# Patient Record
Sex: FEMALE | Race: WHITE | URBAN - METROPOLITAN AREA
[De-identification: names, ages, dates, MRNs, and addresses within clinical notes are randomized per-mention and may not be internally consistent; named-entity substitution may affect disease eponyms.]

---

## 2017-02-11 ENCOUNTER — ALLIED HEALTH/NURSE VISIT (OUTPATIENT)
Dept: CARDIOLOGY | Facility: CLINIC | Age: 32
End: 2017-02-11
Attending: INTERNAL MEDICINE
Payer: COMMERCIAL

## 2017-02-11 DIAGNOSIS — G90.A POTS (POSTURAL ORTHOSTATIC TACHYCARDIA SYNDROME): Primary | ICD-10-CM

## 2017-02-11 PROCEDURE — 93298 REM INTERROG DEV EVAL SCRMS: CPT | Performed by: INTERNAL MEDICINE

## 2017-02-11 PROCEDURE — 93299 C ICM/ILR REMOTE TECH SERV: CPT

## 2017-02-11 NOTE — MR AVS SNAPSHOT
"              After Visit Summary   2017    Shalini Bansal    MRN: [id number]           Patient Information     Date Of Birth          1985        Visit Information        Provider Department      2017 6:00 AM  ICD REMOTE [facility]        Today's Diagnoses     POTS (postural orthostatic tachycardia syndrome)    -  1       Follow-ups after your visit        Follow-up notes from your care team     Call patient with results      Who to contact     If you have questions or need follow up information about today's clinic visit or your schedule please contact [facility] directly at [phone].  Normal or non-critical lab and imaging results will be communicated to you by Receptoshart, letter or phone within 4 business days after the clinic has received the results. If you do not hear from us within 7 days, please contact the clinic through Receptoshart or phone. If you have a critical or abnormal lab result, we will notify you by phone as soon as possible.  Submit refill requests through [email or web address] or call your pharmacy and they will forward the refill request to us. Please allow 3 business days for your refill to be completed.          Additional Information About Your Visit        MyChart Information     [email or web address] lets you send messages to your doctor, view your test results, renew your prescriptions, schedule appointments and more. To sign up, go to [email or web address] . Click on \"Log in\" on the left side of the screen, which will take you to the Welcome page. Then click on \"Sign up Now\" on the right side of the page.     You will be asked to enter the access code listed below, as well as some personal information. Please follow the directions to create your username and password.     Your access code is: [email or web address]  Expires: 2017 12:57 PM     Your access code will  in 90 days. If you need help or a new code, please call your [place] clinic or [phone].        Care EveryWhere " ID     This is your Care EveryWhere ID. This could be used by other organizations to access your [place] medical records  [id number]         Blood Pressure from Last 3 Encounters:   05/02/16 98/70   03/03/16 113/68   02/19/16 115/70    Weight from Last 3 Encounters:   05/02/16 60.3 kg (133 lb)   04/28/16 60.3 kg (133 lb)   03/03/16 60.3 kg (133 lb)              We Performed the Following     ICM/ILR REMOTE [facility]        Primary Care Provider Office Phone # Fax #    Almaz Felder [phone]       [address] [address]        Thank you!     Thank you for choosing [facility]  for your care. Our goal is always to provide you with excellent care. Hearing back from our patients is one way we can continue to improve our services. Please take a few minutes to complete the written survey that you may receive in the mail after your visit with us. Thank you!             Your Updated Medication List - Protect others around you: Learn how to safely use, store and throw away your medicines at www.disposemymeds.org.          This list is accurate as of: 2/11/17 11:59 PM.  Always use your most recent med list.                   Brand Name Dispense Instructions for use    fluticasone-vilanterol 100-25 MCG/INH oral inhaler    BREO ELLIPTA    1 Inhaler    Inhale 1 puff into the lungs daily

## 2017-02-13 NOTE — PROGRESS NOTES
A routine remote loop recorder check was sent for evaluation per MD order.  Her loop recorder does not show any recorded episodes, her heart rate histograms show good heart rate variation and her loop battery is good, she is scheduled for her next remote 5/8/17.  Pt was called with the results and she reports feeling well and she denies any symptoms.  She will send her next remote 5/8/17.    Loop recorder

## 2017-03-28 ENCOUNTER — ALLIED HEALTH/NURSE VISIT (OUTPATIENT)
Dept: CARDIOLOGY | Facility: CLINIC | Age: 32
End: 2017-03-28
Attending: INTERNAL MEDICINE
Payer: COMMERCIAL

## 2017-03-28 DIAGNOSIS — R00.0 SINUS TACHYCARDIA: Primary | ICD-10-CM

## 2017-03-28 PROBLEM — Z95.0 CARDIAC PACEMAKER IN SITU: Status: ACTIVE | Noted: 2017-03-28

## 2017-03-28 PROCEDURE — 93298 REM INTERROG DEV EVAL SCRMS: CPT | Performed by: INTERNAL MEDICINE

## 2017-03-28 PROCEDURE — 93299 HC INTERROGATION DEVICE EVAL REMOTE, LOOP RECORDER: CPT | Mod: ZF

## 2017-03-28 NOTE — MR AVS SNAPSHOT
"              After Visit Summary   3/28/2017    Shalini Bansal    MRN: [id number]           Patient Information     Date Of Birth          1985        Visit Information        Provider Department      3/28/2017 6:00 AM UC ICD REMOTE [facility]        Today's Diagnoses     Sinus tachycardia    -  1       Follow-ups after your visit        Who to contact     If you have questions or need follow up information about today's clinic visit or your schedule please contact [facility] directly at [phone].  Normal or non-critical lab and imaging results will be communicated to you by Zelgorhart, letter or phone within 4 business days after the clinic has received the results. If you do not hear from us within 7 days, please contact the clinic through Zelgorhart or phone. If you have a critical or abnormal lab result, we will notify you by phone as soon as possible.  Submit refill requests through [employer] or call your pharmacy and they will forward the refill request to us. Please allow 3 business days for your refill to be completed.          Additional Information About Your Visit        MyChart Information     [employer] lets you send messages to your doctor, view your test results, renew your prescriptions, schedule appointments and more. To sign up, go to [email or web address] . Click on \"Log in\" on the left side of the screen, which will take you to the Welcome page. Then click on \"Sign up Now\" on the right side of the page.     You will be asked to enter the access code listed below, as well as some personal information. Please follow the directions to create your username and password.     Your access code is: [email or web address]  Expires: 2017  1:57 PM     Your access code will  in 90 days. If you need help or a new code, please call your [place] clinic or [phone].        Care EveryWhere ID     This is your Care EveryWhere ID. This could be used by other organizations to access your " [place] medical records  [id number]         Blood Pressure from Last 3 Encounters:   05/02/16 98/70   03/03/16 113/68   02/19/16 115/70    Weight from Last 3 Encounters:   05/02/16 60.3 kg (133 lb)   04/28/16 60.3 kg (133 lb)   03/03/16 60.3 kg (133 lb)              We Performed the Following     REMOTE LOOP/OPTIVOL INTERROGATION        Primary Care Provider Office Phone # Fax #    Almaz Felder [phone]       [address] [address]        Thank you!     Thank you for choosing [facility]  for your care. Our goal is always to provide you with excellent care. Hearing back from our patients is one way we can continue to improve our services. Please take a few minutes to complete the written survey that you may receive in the mail after your visit with us. Thank you!             Your Updated Medication List - Protect others around you: Learn how to safely use, store and throw away your medicines at www.disposemymeds.org.          This list is accurate as of: 3/28/17 12:42 PM.  Always use your most recent med list.                   Brand Name Dispense Instructions for use    fluticasone-vilanterol 100-25 MCG/INH oral inhaler    BREO ELLIPTA    1 Inhaler    Inhale 1 puff into the lungs daily

## 2017-03-28 NOTE — PROGRESS NOTES
Medtronic ILR remote transmission received and reviewed. Device transmission sent per MD orders. Normal device function. 1 symptom activation recorded and 1 tachy episode recorded. These were at the same time. The EGM shows a narrow complex tachycardia @ 214 bpm. The EGMs reviewed with Dr. Heller. This has the same morphology as her sinus beats. VM left for pt to call and discuss symptoms. Plan for pt to send another Carelink remote transmission on 6/29/17.

## 2017-10-08 ENCOUNTER — HEALTH MAINTENANCE LETTER (OUTPATIENT)
Age: 32
End: 2017-10-08

## 2018-07-27 ENCOUNTER — ALLIED HEALTH/NURSE VISIT (OUTPATIENT)
Dept: CARDIOLOGY | Facility: CLINIC | Age: 33
End: 2018-07-27
Attending: INTERNAL MEDICINE
Payer: COMMERCIAL

## 2018-07-27 DIAGNOSIS — R55 SYNCOPE: Primary | ICD-10-CM

## 2018-07-27 PROCEDURE — 93291 INTERROG DEV EVAL SCRMS IP: CPT | Mod: ZF

## 2018-07-27 NOTE — PROGRESS NOTES
Preliminary Device Interrogation Results.  Final physician signed paceart report to be scanned and attached.    Patient seen in clinic for evaluation and iterative programming of Medtronic Linq loop recorder per MD orders. Patient noted that she needs a DOT letter. The request will be sent to Dr. Salgado and Keisha Aguilera RN.  Normal loop recorder function. Since last remote transmission 3/28/2017 there has only been one Tachy episode on April 23rd 2017 that the patient didn't remember. The tachy episode displayed a regular rhythm@ rates 200bpm for 29 seconds.The patient reports she has been feeling great and has not had any symptoms of syncope, dizziness, palpations in last year. Intrinsic rhythm = regular VS @ 80's bpm.  Loop recorder battery status = good. Patient has concerns about the costs associated with remote monitoring so does not want to schedule a remote. Education on the importance of her contacting device clinic with any symptoms in the future. Plan for patient to be seen April 2019 with device clinic and Dr. Salgado as the device will be approach RRT.    Loop recorder interrogation

## 2018-07-27 NOTE — MR AVS SNAPSHOT
"              After Visit Summary   7/27/2018    Shalini Bansal    MRN: [id number]           Patient Information     Date Of Birth          1985        Visit Information        Provider Department      7/27/2018 11:00 AM 1, Uc Cv Device [facility]        Today's Diagnoses     Syncope    -  1       Follow-ups after your visit        Additional Services     Follow-Up with Device Clinic-6 months       Prior to Dr. Naomi del rio            Follow-Up with Electrophysiologist-6 Months       Device prior; Device approaching RRT. Establishing plan.                  Who to contact     If you have questions or need follow up information about today's clinic visit or your schedule please contact [facility] directly at [phone].  Normal or non-critical lab and imaging results will be communicated to you by MyChart, letter or phone within 4 business days after the clinic has received the results. If you do not hear from us within 7 days, please contact the clinic through MyChart or phone. If you have a critical or abnormal lab result, we will notify you by phone as soon as possible.  Submit refill requests through [employer]System[employer] or call your pharmacy and they will forward the refill request to us. Please allow 3 business days for your refill to be completed.          Additional Information About Your Visit        MyChart Information     CoolSystems lets you send messages to your doctor, view your test results, renew your prescriptions, schedule appointments and more. To sign up, go to [email or web address] . Click on \"Log in\" on the left side of the screen, which will take you to the Welcome page. Then click on \"Sign up Now\" on the right side of the page.     You will be asked to enter the access code listed below, as well as some personal information. Please follow the directions to create your username and password.     Your access code is: [email or web address]  Expires: 10/23/2018  6:31 AM     Your access code will "  in 90 days. If you need help or a new code, please call your [place] clinic or [phone].        Care EveryWhere ID     This is your Care EveryWhere ID. This could be used by other organizations to access your [place] medical records  [id number]         Blood Pressure from Last 3 Encounters:   16 98/70   16 113/68   16 115/70    Weight from Last 3 Encounters:   16 60.3 kg (133 lb)   16 60.3 kg (133 lb)   16 60.3 kg (133 lb)              We Performed the Following     INTERR DEVICE EVAL IN PERSON, LOOP RECORDER        Primary Care Provider Office Phone # Fax #    Almaz Felder [phone] [address] [facility] [address]        Equal Access to Services     [facility]: Hadii aad ku hadasho Soomaali, waaxda luqadaha, qaybta kaalmada adeegyada, betsey garg haycyn smith . So [facility] [phone].    ATENCIÓN: Si habla español, tiene a lee disposición servicios gratuitos de asistencia lingüística. Davidame al [phone].    We comply with applicable federal civil rights laws and [place] laws. We do not discriminate on the basis of race, color, national origin, age, disability, sex, sexual orientation, or gender identity.            Thank you!     Thank you for choosing [facility]  for your care. Our goal is always to provide you with excellent care. Hearing back from our patients is one way we can continue to improve our services. Please take a few minutes to complete the written survey that you may receive in the mail after your visit with us. Thank you!             Your Updated Medication List - Protect others around you: Learn how to safely use, store and throw away your medicines at www.disposemymeds.org.          This list is accurate as of 18 11:59 PM.  Always use your most recent med list.                   Brand Name Dispense Instructions for use Diagnosis    fluticasone-vilanterol 100-25  MCG/INH oral inhaler    BREO ELLIPTA    1 Inhaler    Inhale 1 puff into the lungs daily    Asthma

## 2018-07-30 ENCOUNTER — CARE COORDINATION (OUTPATIENT)
Dept: CARDIOLOGY | Facility: CLINIC | Age: 33
End: 2018-07-30

## 2019-07-23 NOTE — TELEPHONE ENCOUNTER
RECORDS RECEIVED FROM: Internal   DATE RECEIVED: 8-1   NOTES STATUS DETAILS   OFFICE NOTE from referring provider    N/A    OFFICE NOTE from other cardiologists   and neurologists Internal    DISCHARGE SUMMARY from hospital    Internal 2016   DISCHARGE REPORT from the ER   N/A    OPERATIVE REPORT    Internal Ablation 5-2-16   MEDICATION LIST   Internal    LABS     BMP   N/A    CBC   Internal 2010   CMP   N/A    Lipids   Care Everywhere 11-5-18   TSH   Internal 2015   DIAGNOSTIC PROCEDURES     EKG  Strips *important Internal 2016   Monitor Reports  Strips *important Internal    Cardioversions   N/A    ICD/pacemaker implant   Yes    Tilt table studies   N/A    IMAGING (DISC & REPORT)      ECHO's   Internal 2015   Stress Tests   Internal 2015   Cath   N/A    CT/CTA   N/A    MRI/MRA   N/A

## 2019-08-01 ENCOUNTER — OFFICE VISIT (OUTPATIENT)
Dept: CARDIOLOGY | Facility: CLINIC | Age: 34
End: 2019-08-01
Attending: INTERNAL MEDICINE
Payer: COMMERCIAL

## 2019-08-01 ENCOUNTER — PRE VISIT (OUTPATIENT)
Dept: CARDIOLOGY | Facility: CLINIC | Age: 34
End: 2019-08-01

## 2019-08-01 VITALS
DIASTOLIC BLOOD PRESSURE: 73 MMHG | HEIGHT: 64 IN | BODY MASS INDEX: 24.59 KG/M2 | OXYGEN SATURATION: 99 % | HEART RATE: 72 BPM | WEIGHT: 144 LBS | SYSTOLIC BLOOD PRESSURE: 113 MMHG

## 2019-08-01 DIAGNOSIS — R55 SYNCOPE AND COLLAPSE: Primary | ICD-10-CM

## 2019-08-01 DIAGNOSIS — R55 SYNCOPE AND COLLAPSE: ICD-10-CM

## 2019-08-01 LAB
MDC_IDC_EPISODE_DTM: NORMAL
MDC_IDC_EPISODE_DURATION: 50 S
MDC_IDC_EPISODE_ID: 6
MDC_IDC_EPISODE_TYPE: NORMAL
MDC_IDC_MSMT_BATTERY_DTM: NORMAL
MDC_IDC_MSMT_BATTERY_STATUS: NORMAL
MDC_IDC_PG_IMPLANT_DTM: NORMAL
MDC_IDC_PG_MFG: NORMAL
MDC_IDC_PG_MODEL: NORMAL
MDC_IDC_PG_SERIAL: NORMAL
MDC_IDC_PG_TYPE: NORMAL
MDC_IDC_SESS_CLINIC_NAME: NORMAL
MDC_IDC_SESS_DTM: NORMAL
MDC_IDC_SESS_TYPE: NORMAL
MDC_IDC_SET_ZONE_DETECTION_INTERVAL: 2000 MS
MDC_IDC_SET_ZONE_DETECTION_INTERVAL: 300 MS
MDC_IDC_SET_ZONE_DETECTION_INTERVAL: 3000 MS
MDC_IDC_SET_ZONE_TYPE: NORMAL
MDC_IDC_STAT_AT_BURDEN_PERCENT: 0 %
MDC_IDC_STAT_AT_DTM_END: NORMAL
MDC_IDC_STAT_AT_DTM_START: NORMAL
MDC_IDC_STAT_EPISODE_RECENT_COUNT: 0
MDC_IDC_STAT_EPISODE_RECENT_COUNT: 1
MDC_IDC_STAT_EPISODE_RECENT_COUNT_DTM_END: NORMAL
MDC_IDC_STAT_EPISODE_RECENT_COUNT_DTM_START: NORMAL
MDC_IDC_STAT_EPISODE_TOTAL_COUNT: 0
MDC_IDC_STAT_EPISODE_TOTAL_COUNT: 2
MDC_IDC_STAT_EPISODE_TOTAL_COUNT: 4
MDC_IDC_STAT_EPISODE_TOTAL_COUNT_DTM_END: NORMAL
MDC_IDC_STAT_EPISODE_TOTAL_COUNT_DTM_START: NORMAL
MDC_IDC_STAT_EPISODE_TYPE: NORMAL

## 2019-08-01 PROCEDURE — G0463 HOSPITAL OUTPT CLINIC VISIT: HCPCS | Mod: 25,ZF

## 2019-08-01 PROCEDURE — 99203 OFFICE O/P NEW LOW 30 MIN: CPT | Mod: ZP | Performed by: INTERNAL MEDICINE

## 2019-08-01 RX ORDER — SODIUM CHLORIDE 9 MG/ML
INJECTION, SOLUTION INTRAVENOUS CONTINUOUS
Status: CANCELLED | OUTPATIENT
Start: 2019-08-01

## 2019-08-01 RX ORDER — LIDOCAINE 40 MG/G
CREAM TOPICAL
Status: CANCELLED | OUTPATIENT
Start: 2019-08-01

## 2019-08-01 RX ORDER — CEFAZOLIN SODIUM 2 G/50ML
2 SOLUTION INTRAVENOUS
Status: CANCELLED | OUTPATIENT
Start: 2019-08-01

## 2019-08-01 ASSESSMENT — PAIN SCALES - GENERAL: PAINLEVEL: NO PAIN (0)

## 2019-08-01 ASSESSMENT — MIFFLIN-ST. JEOR: SCORE: 1343.18

## 2019-08-01 NOTE — NURSING NOTE
Chief Complaint   Patient presents with     New Patient      Not seen since 2016 - Follow-up per patient, wants to discuss her current ILR.      Vitals were taken and medications were reconciled.     Claudia Mendez CMA    4:19 PM

## 2019-08-01 NOTE — LETTER
8/1/2019      RE: Shalini Bansal  [address] [place] [address]       Dear Colleague,    Thank you for the opportunity to participate in the care of your patient, Shalini Bansal, at the [facility] HEART [facility]. Please see a copy of my visit note below.    August 1, 2019    Reason for Referral: inappropriate sinus tachycardia, now with fewer symptoms    HPI:     Shalini Bansal is a 33 year old female with IST history and ILR in situ who presents for  F/u and poss ILR removal.    ILR was implanted 3 years ago. Pt would like it to be removed as she is feeling better.     Pt has not had any symptoms in a long time.     Pt  denies recent fevers, new onset headache, acute vision changes, cough, sore throat, heartburn, chest pain, shortness of breath, abd pain, diarrhea, urination changes, arthralgias or myalgias.     PAST MEDICAL HISTORY:  Past Medical History:   Diagnosis Date     Asthma     response to methacholine challenge at highest dose.     Dysmenorrhea      Endometriosis      GERD (gastroesophageal reflux disease) 5/04    ON PROTONIX     Heart arrhythmias     INAPPROPRIATE SINUS TACHYCARDIA. DR MARTINEZ CARDIOLOGIST @ [place]     Irregular periods      Other and unspecified ovarian cyst     Ovarian cyst     Palpitations      Sinus tachycardia     prior atenolol therapy     Syncope        CURRENT MEDICATIONS:  Current Outpatient Medications   Medication Sig Dispense Refill     fluticasone - vilanterol (BREO ELLIPTA) 100-25 MCG/INH oral inhaler Inhale 1 puff into the lungs daily (Patient not taking: Reported on 8/1/2019) 1 Inhaler 12       PAST SURGICAL HISTORY:  Past Surgical History:   Procedure Laterality Date     SURGICAL HISTORY OF -       laparoscopy-RT OVARIAN MASS     SURGICAL HISTORY OF -       RT wrist       ALLERGIES:   No Known Allergies    FAMILY HISTORY:  Family History   Problem Relation Age of Onset     Cancer Maternal Grandfather         leukemia  "    Other - See Comments Paternal Grandmother         CREST     Pulmonary Hypertension Paternal Grandmother      Coronary Artery Disease Paternal Grandfather      SOCIAL HISTORY:  Social History     Tobacco Use     Smoking status: Former Smoker     Last attempt to quit: 2010     Years since quittin.5     Smokeless tobacco: Never Used   Substance Use Topics     Alcohol use: Yes     Alcohol/week: 0.0 oz     Comment: Occ     Drug use: No       ROS:   12-point ROS otherwise negative except as noted in HPI    Exam:  /73 (BP Location: Left arm, Patient Position: Chair, Cuff Size: Adult Regular)   Pulse 72   Ht 1.626 m (5' 4\")   Wt 65.3 kg (144 lb)   SpO2 99%   BMI 24.72 kg/m     GENERAL APPEARANCE: healthy, alert and no distress  HEENT: no icterus, no xanthelasmas, normal pupil size and reaction, normal palate, mucosa moist, no central cyanosis  NECK: no adenopathy, no asymmetry, masses, or scars, thyroid normal to palpation and no bruits, JVP not elevated  RESPIRATORY: lungs clear to auscultation - no rales, rhonchi or wheezes, no use of accessory muscles, no retractions, respirations are unlabored, normal respiratory rate  CARDIOVASCULAR: regular rhythm, normal S1 with physiologic split S2, no S3 or S4 and no murmur, click or rub, precordium quiet with normal PMI.  ABDOMEN: soft, non tender, without hepatosplenomegaly, no masses palpable, bowel sounds normal, aorta not enlarged by palpation, no abdominal bruits  EXTREMITIES: peripheral pulses normal, no edema, no bruits  NEURO: alert and oriented to person/place/time, normal speech, gait and affect  VASC: Radial, femoral, dorsalis pedis and posterior tibialis pulses are normal in volumes and symmetric bilaterally. No bruits are heard.  SKIN: no ecchymoses, no rashes    Labs:  CBC RESULTS:   Lab Results   Component Value Date    WBC 6.2 02/10/2010    RBC 4.05 02/10/2010    HGB 12.9 02/10/2010    HCT 37.7 02/10/2010    MCV 93 02/10/2010    MCH 31.9 " 02/10/2010    MCHC 34.3 02/10/2010    RDW 11.4 02/10/2010     02/10/2010       BMP RESULTS:  No results found for: NA, POTASSIUM, CHLORIDE, CO2, ANIONGAP, GLC, BUN, CR, GFRESTIMATED, GFRESTBLACK, DANA     INR RESULTS:  No results found for: INR    Procedures:  PULMONARY FUNCTION TESTS:   PFT Latest Ref Rng & Units 11/11/2015   FVC L 4.20   FEV1 L 3.24   FVC% % 106   FEV1% % 97     Assessment and Plan:   33M h/o inappropriate sinus tachycardia presents to schedule removal of her ILR due to RRT. We will schedule to have the device removed.     Patient was seen and discussed with attending physician, Dr. Payal Salgado MD.     Phong Dawson MD, PhD  Cardiology Fellow      I very much appreciated the opportunity to see and assess Shalini Bansal in the clinic with CV Fellow Dr Dawson. Please do not hesitate to contact my office if you have any questions or concerns.      Payal Salgado MD  Cardiac Arrhythmia Service  [facility]  [phone]    PAYAL SALGADO

## 2019-08-01 NOTE — PATIENT INSTRUCTIONS
"          You were seen in the Electrophysiology Clinic today by:  Dr. Shaun Salgado MD.    Plan:     Medication Changes: None    Labs/Tests Needed: Loop Recorder Explant    Follow up visit: As needed    Further Instructions: None      Your Care Team:  EP Cardiology   Telephone Number     Breana Centeno RN [phone]     For scheduling appts or procedures:    Carrie Thomas   [phone]   For the Device Clinic (Pacemakers, ICDs, Loop Recorders)    During business hours: [phone]  After business hours:   [phone]- select option 4 and ask for job code [phone].       Cardiovascular Clinic:   [address] [place] [address]. [place] [address]      As always, Thank you for trusting us with your health care needs!       You are scheduled for a Loop Recorder Explant.    Follow these instructions:     1. You should arrive at the [facility] waiting room, located in the [facility], at __7am on August 12, 2019_____. The address is: [address] [place] [address].       2. Do not eat or drink for 6 hours prior to arrival.     3. The morning of this procedure, you may take your normal AM medications with a sip of water.     4. You will discharge the same day. You may drive yourself.    5. Use the antibacterial wipes the night before and morning of your procedure.   Follow the included instructions. This is to reduce surface bacteria to help prevent infection.          If you have further questions, please utilize [employer] to contact us.   If your question concerns the above instructions, contact:  Breana Centeno RN  Electrophysiology Nurse Coordinator.  [phone]    If your question concerns the schedule/appointment times, contact:  [facility]   [phone]        Preparing the Skin Before Surgery  Preparing or \"prepping\" skin before surgery can reduce the risk of infection at the surgical site. To make the process easier, this facility has chosen disposable cloths moistened with " rinse-free 2% Chlorhexidine Gluconate antiseptic solution designed to reduce the bacteria on the skin. The steps below outline the prepping process and should be carefully followed.    Prep the skin at the following time(s):    - If you wish to shower, bathe or shampoo your hair, do so the night before and prep your skin afterwards for the first time using one package of cloths.    - Skin must be prepped the morning of the procedure using the second package of cloths.        Prep The Night Before Procedure    Do not allow this product to come in contact with your eyes, ears, mouth or mucous membranes.     Reach into one of the packages, remove the two cloths with the foam lópez and place onto a clean table.    Use one clean cloth to prep each are of the body. One cloth for #1 - neck & chest. One cloth for #2 & #3 - both arms, shoulder to fingertips including armpits. Wipe each area in a back and forth motion for 3 minutes. Be sure to wipe each area thoroughly.    Do not rinse or apply any lotions, moisturizer or make up after prepping.    Discard cloths in trash can.     Allow your skin to air dry. Dress in clean clothes/sleepwear      Prepping your skin on the morning of surgery:    Do not shower, bathe or shampoo hair.    Open a new package and follow the instructions listed above.

## 2019-08-01 NOTE — PROGRESS NOTES
2019    Reason for Referral: inappropriate sinus tachycardia, now with fewer symptoms    HPI:     Shalini Bansal is a 33 year old female with IST history and ILR in situ who presents for  F/u and poss ILR removal.    ILR was implanted 3 years ago. Pt would like it to be removed as she is feeling better.     Pt has not had any symptoms in a long time.     Pt  denies recent fevers, new onset headache, acute vision changes, cough, sore throat, heartburn, chest pain, shortness of breath, abd pain, diarrhea, urination changes, arthralgias or myalgias.     PAST MEDICAL HISTORY:  Past Medical History:   Diagnosis Date     Asthma     response to methacholine challenge at highest dose.     Dysmenorrhea      Endometriosis      GERD (gastroesophageal reflux disease)     ON PROTONIX     Heart arrhythmias     INAPPROPRIATE SINUS TACHYCARDIA. DR MARTINEZ CARDIOLOGIST @ [facility]     Irregular periods      Other and unspecified ovarian cyst     Ovarian cyst     Palpitations      Sinus tachycardia     prior atenolol therapy     Syncope        CURRENT MEDICATIONS:  Current Outpatient Medications   Medication Sig Dispense Refill     fluticasone - vilanterol (BREO ELLIPTA) 100-25 MCG/INH oral inhaler Inhale 1 puff into the lungs daily (Patient not taking: Reported on 2019) 1 Inhaler 12       PAST SURGICAL HISTORY:  Past Surgical History:   Procedure Laterality Date     SURGICAL HISTORY OF -       laparoscopy-RT OVARIAN MASS     SURGICAL HISTORY OF -       RT wrist       ALLERGIES:   No Known Allergies    FAMILY HISTORY:  Family History   Problem Relation Age of Onset     Cancer Maternal Grandfather         leukemia     Other - See Comments Paternal Grandmother         CREST     Pulmonary Hypertension Paternal Grandmother      Coronary Artery Disease Paternal Grandfather      SOCIAL HISTORY:  Social History     Tobacco Use     Smoking status: Former Smoker     Last attempt to quit: 2010     Years since quittin.5  "    Smokeless tobacco: Never Used   Substance Use Topics     Alcohol use: Yes     Alcohol/week: 0.0 oz     Comment: Occ     Drug use: No       ROS:   12-point ROS otherwise negative except as noted in HPI    Exam:  /73 (BP Location: Left arm, Patient Position: Chair, Cuff Size: Adult Regular)   Pulse 72   Ht 1.626 m (5' 4\")   Wt 65.3 kg (144 lb)   SpO2 99%   BMI 24.72 kg/m    GENERAL APPEARANCE: healthy, alert and no distress  HEENT: no icterus, no xanthelasmas, normal pupil size and reaction, normal palate, mucosa moist, no central cyanosis  NECK: no adenopathy, no asymmetry, masses, or scars, thyroid normal to palpation and no bruits, JVP not elevated  RESPIRATORY: lungs clear to auscultation - no rales, rhonchi or wheezes, no use of accessory muscles, no retractions, respirations are unlabored, normal respiratory rate  CARDIOVASCULAR: regular rhythm, normal S1 with physiologic split S2, no S3 or S4 and no murmur, click or rub, precordium quiet with normal PMI.  ABDOMEN: soft, non tender, without hepatosplenomegaly, no masses palpable, bowel sounds normal, aorta not enlarged by palpation, no abdominal bruits  EXTREMITIES: peripheral pulses normal, no edema, no bruits  NEURO: alert and oriented to person/place/time, normal speech, gait and affect  VASC: Radial, femoral, dorsalis pedis and posterior tibialis pulses are normal in volumes and symmetric bilaterally. No bruits are heard.  SKIN: no ecchymoses, no rashes    Labs:  CBC RESULTS:   Lab Results   Component Value Date    WBC 6.2 02/10/2010    RBC 4.05 02/10/2010    HGB 12.9 02/10/2010    HCT 37.7 02/10/2010    MCV 93 02/10/2010    MCH 31.9 02/10/2010    MCHC 34.3 02/10/2010    RDW 11.4 02/10/2010     02/10/2010       BMP RESULTS:  No results found for: NA, POTASSIUM, CHLORIDE, CO2, ANIONGAP, GLC, BUN, CR, GFRESTIMATED, GFRESTBLACK, DANA     INR RESULTS:  No results found for: INR    Procedures:  PULMONARY FUNCTION TESTS:   PFT Latest Ref Rng & " Units 11/11/2015   FVC L 4.20   FEV1 L 3.24   FVC% % 106   FEV1% % 97     Assessment and Plan:   33M h/o inappropriate sinus tachycardia presents to schedule removal of her ILR due to RRT. We will schedule to have the device removed.     Patient was seen and discussed with attending physician, Dr. Shaun Salgado MD.     Phong Dawson MD, PhD  Cardiology Fellow      I very much appreciated the opportunity to see and assess Shalini Bansal in the clinic with CV Fellow Dr Dawson. Please do not hesitate to contact my office if you have any questions or concerns.      Shaun Salgado MD  Cardiac Arrhythmia Service  [facility]  [phone]  CC  SHAUN SALGADO

## 2019-08-01 NOTE — PATIENT INSTRUCTIONS
It was a pleasure to see you in clinic today. Please do not hesitate to call with any questions or concerns.     Kellee aZvala, RN  Electrophysiology Nurse Clinician  [facility]  During business hours call:  [phone]  After business hours please call: [phone]- select option #4 and ask for job code [id number].

## 2019-08-12 ENCOUNTER — HOSPITAL ENCOUNTER (OUTPATIENT)
Facility: CLINIC | Age: 34
Discharge: HOME | End: 2019-08-12
Attending: INTERNAL MEDICINE | Admitting: INTERNAL MEDICINE
Payer: COMMERCIAL

## 2019-08-12 ENCOUNTER — APPOINTMENT (OUTPATIENT)
Dept: MEDSURG UNIT | Facility: CLINIC | Age: 34
End: 2019-08-12
Attending: INTERNAL MEDICINE
Payer: COMMERCIAL

## 2019-08-12 VITALS
SYSTOLIC BLOOD PRESSURE: 112 MMHG | BODY MASS INDEX: 24.59 KG/M2 | DIASTOLIC BLOOD PRESSURE: 72 MMHG | RESPIRATION RATE: 16 BRPM | TEMPERATURE: 98.1 F | HEIGHT: 64 IN | WEIGHT: 144 LBS | OXYGEN SATURATION: 99 % | HEART RATE: 99 BPM

## 2019-08-12 DIAGNOSIS — R55 SYNCOPE AND COLLAPSE: Primary | ICD-10-CM

## 2019-08-12 DIAGNOSIS — R55 SYNCOPE AND COLLAPSE: ICD-10-CM

## 2019-08-12 LAB
ANION GAP SERPL CALCULATED.3IONS-SCNC: 2 MMOL/L (ref 3–14)
B-HCG SERPL-ACNC: <1 IU/L (ref 0–5)
BUN SERPL-MCNC: 5 MG/DL (ref 7–30)
CALCIUM SERPL-MCNC: 8.9 MG/DL (ref 8.5–10.1)
CHLORIDE SERPL-SCNC: 108 MMOL/L (ref 94–109)
CO2 SERPL-SCNC: 29 MMOL/L (ref 20–32)
CREAT SERPL-MCNC: 0.77 MG/DL (ref 0.52–1.04)
ERYTHROCYTE [DISTWIDTH] IN BLOOD BY AUTOMATED COUNT: 12.1 % (ref 10–15)
GFR SERPL CREATININE-BSD FRML MDRD: >90 ML/MIN/{1.73_M2}
GLUCOSE SERPL-MCNC: 82 MG/DL (ref 70–99)
HCT VFR BLD AUTO: 38 % (ref 35–47)
HGB BLD-MCNC: 12.3 G/DL (ref 11.7–15.7)
INTERPRETATION ECG - MUSE: NORMAL
MCH RBC QN AUTO: 30.5 PG (ref 26.5–33)
MCHC RBC AUTO-ENTMCNC: 32.4 G/DL (ref 31.5–36.5)
MCV RBC AUTO: 94 FL (ref 78–100)
PLATELET # BLD AUTO: 261 10E9/L (ref 150–450)
POTASSIUM SERPL-SCNC: 4 MMOL/L (ref 3.4–5.3)
RBC # BLD AUTO: 4.03 10E12/L (ref 3.8–5.2)
SODIUM SERPL-SCNC: 138 MMOL/L (ref 133–144)
WBC # BLD AUTO: 9.3 10E9/L (ref 4–11)

## 2019-08-12 PROCEDURE — 33286 RMVL SUBQ CAR RHYTHM MNTR: CPT | Mod: GC | Performed by: INTERNAL MEDICINE

## 2019-08-12 PROCEDURE — 40000065 ZZH STATISTIC EKG NON-CHARGEABLE

## 2019-08-12 PROCEDURE — 93010 ELECTROCARDIOGRAM REPORT: CPT | Mod: 59 | Performed by: INTERNAL MEDICINE

## 2019-08-12 PROCEDURE — 85027 COMPLETE CBC AUTOMATED: CPT | Performed by: INTERNAL MEDICINE

## 2019-08-12 PROCEDURE — 80048 BASIC METABOLIC PNL TOTAL CA: CPT | Performed by: INTERNAL MEDICINE

## 2019-08-12 PROCEDURE — 25000125 ZZHC RX 250: Performed by: INTERNAL MEDICINE

## 2019-08-12 PROCEDURE — 93005 ELECTROCARDIOGRAM TRACING: CPT

## 2019-08-12 PROCEDURE — 33286 RMVL SUBQ CAR RHYTHM MNTR: CPT | Performed by: INTERNAL MEDICINE

## 2019-08-12 PROCEDURE — 84702 CHORIONIC GONADOTROPIN TEST: CPT | Performed by: INTERNAL MEDICINE

## 2019-08-12 PROCEDURE — 40000166 ZZH STATISTIC PP CARE STAGE 1

## 2019-08-12 PROCEDURE — 25800030 ZZH RX IP 258 OP 636: Performed by: INTERNAL MEDICINE

## 2019-08-12 PROCEDURE — 25000128 H RX IP 250 OP 636: Performed by: INTERNAL MEDICINE

## 2019-08-12 PROCEDURE — 27210794 ZZH OR GENERAL SUPPLY STERILE: Performed by: INTERNAL MEDICINE

## 2019-08-12 RX ORDER — SODIUM CHLORIDE 9 MG/ML
INJECTION, SOLUTION INTRAVENOUS CONTINUOUS
Status: DISCONTINUED | OUTPATIENT
Start: 2019-08-12 | End: 2019-08-12 | Stop reason: HOSPADM

## 2019-08-12 RX ORDER — LIDOCAINE 40 MG/G
CREAM TOPICAL
Status: DISCONTINUED | OUTPATIENT
Start: 2019-08-12 | End: 2019-08-12 | Stop reason: HOSPADM

## 2019-08-12 RX ORDER — CEFAZOLIN SODIUM 2 G/100ML
2 INJECTION, SOLUTION INTRAVENOUS
Status: COMPLETED | OUTPATIENT
Start: 2019-08-12 | End: 2019-08-12

## 2019-08-12 RX ADMIN — SODIUM CHLORIDE: 9 INJECTION, SOLUTION INTRAVENOUS at 08:01

## 2019-08-12 ASSESSMENT — MIFFLIN-ST. JEOR: SCORE: 1338.18

## 2019-08-12 NOTE — IP AVS SNAPSHOT
MRN:[id number]                      After Visit Summary   8/12/2019    Shalini Bansal    MRN: [id number]           Visit Information        Department      8/12/2019  7:10 AM [address] [facility]          Review of your medicines      UNREVIEWED medicines. Ask your doctor about these medicines       Dose / Directions   fluticasone-vilanterol 100-25 MCG/INH inhaler  Commonly known as:  BREO ELLIPTA  Used for:  Asthma      Dose:  1 puff  Inhale 1 puff into the lungs daily  Quantity:  1 Inhaler  Refills:  12              Protect others around you: Learn how to safely use, store and throw away your medicines at www.disposemymeds.org.       Follow-ups after your visit       Your next 10 appointments already scheduled    Nov 06, 2019 12:00 AM CST  CARDIAC DEVICE CHECK - REMOTE with  ICD REMOTE  [facility] ([facility]) [address] [place] [id number] [phone]         Care Instructions       After Care Instructions     Discharge Instructions - Keep incision dry for 72 hours      Keep incision dry for 72 hours (unless Derma Gutierrez was applied)           Further instructions from your care team        Loop Recorder Explant    Physician:      AFTER YOU GO HOME:   DO    Relax and take it easy for 24 hours.    Drink plenty of fluids, unless otherwise instructed by the physician.    Resume your regular diet, unless otherwise instructed by the physician.    Continue you medications, unless otherwise instructed by the physician.              DON'T    NO strenuous exercise of lifting above the shoulder level for the next 3 days.    NO swimming until incision is completely healed (approximately 3 weeks).    NO lotion or powder to the incision site until completely healed.       You may shower, but do not scrub the incision or apply lotion or powder to the site for 7-10 days or until the derma bond glue is removed.     CALL YOUR PRIMARY PHYSICIAN, OR  "THE CARDIOLOGY FELLOW IF:    YOU develop a fever or any redness, swelling, or drainage from your incision      Telephone: [phone]  Ask for the Cardiology Fellow on-call.  Someone is on-call 24hrs/day.                          FOLLOW-UP APPOINTMENTS:  Please make an appointment to have your incision checked 7-10 days postop with your primary care physician.               Additional Information About Your Visit       MyChart Information    [email or web address] lets you send messages to your doctor, view your test results, renew your prescriptions, schedule appointments and more. To sign up, go to [email or web address] . Click on \"Log in\" on the left side of the screen, which will take you to the Welcome page. Then click on \"Sign up Now\" on the right side of the page.     You will be asked to enter the access code listed below, as well as some personal information. Please follow the directions to create your username and password.     Your access code is: [email or web address]  Expires: 2019  6:30 AM     Your access code will  in 60 days. If you need help or a new code, please call your [place] clinic or [phone].       Care EveryWhere ID    This is your Care EveryWhere ID. This could be used by other organizations to access your [facility] medical records  [id number]       Your Vitals Were     Blood Pressure   112/72 (BP Location: Right arm, Cuff Size: Adult Regular)          Pulse   99          Temperature   98.1  F (36.7  C) (Oral)          Respirations   16          Height   1.626 m (5' 4\")             Weight   65.3 kg (144 lb)    Pulse Oximetry   99%    BMI (Body Mass Index)   24.72 kg/m           Primary Care Provider Fax #    Physician No Ref-Primary [phone]      Equal Access to Services    [facility] : Bridgett Casey, radu castellanos, lizette owens, betsey crook. So [facility] [phone].    ATENCIÓN: Si habla español, tiene a lee disposición " servicios gratuitos de asistencia lingüística. Emelyn franco [phone].    We comply with applicable federal civil rights laws and [place] laws. We do not discriminate on the basis of race, color, national origin, age, disability, sex, sexual orientation, or gender identity.           Thank you!    Thank you for choosing [facility] for your care. Our goal is always to provide you with excellent care. Hearing back from our patients is one way we can continue to improve our services. Please take a few minutes to complete the written survey that you may receive in the mail after you visit with us. Thank you!            Medication List      ASK your doctor about these medications          Morning Afternoon Evening Bedtime As Needed    fluticasone-vilanterol 100-25 MCG/INH inhaler  Also known as:  TEAGAN DENGTA  INSTRUCTIONS:  Inhale 1 puff into the lungs daily

## 2019-08-12 NOTE — PROGRESS NOTES
Pt back from CCL s/p loop recorder explant.  VSS.  Pt alert and oriented x4.  Pt denies any pain.  Mid chest incision C/D/I.  0945-Pt tolerated po well.  Pt tolerated ambulation in the hallway.  Discharge instructions went over with and given to pt, all questions answered.  PIV D/C'ed, catheter intact.  0950-Pt D/C'ed to home.

## 2019-08-12 NOTE — DISCHARGE INSTRUCTIONS
Loop Recorder Explant    Physician:      AFTER YOU GO HOME:   DO    Relax and take it easy for 24 hours.    Drink plenty of fluids, unless otherwise instructed by the physician.    Resume your regular diet, unless otherwise instructed by the physician.    Continue you medications, unless otherwise instructed by the physician.              DON'T    NO strenuous exercise of lifting above the shoulder level for the next 3 days.    NO swimming until incision is completely healed (approximately 3 weeks).    NO lotion or powder to the incision site until completely healed.       You may shower, but do not scrub the incision or apply lotion or powder to the site for 7-10 days or until the derma bond glue is removed.     CALL YOUR PRIMARY PHYSICIAN, OR THE CARDIOLOGY FELLOW IF:    YOU develop a fever or any redness, swelling, or drainage from your incision      Telephone: [phone]  Ask for the Cardiology Fellow on-call.  Someone is on-call 24hrs/day.                          FOLLOW-UP APPOINTMENTS:  Please make an appointment to have your incision checked 7-10 days postop with your primary care physician.

## 2019-08-12 NOTE — PROGRESS NOTES
Pt admitted to 2A for a loop recorder explant.  PIV Placed and labs sent.  Pt needs consent.  H and P up to date.  No family present.  Pt has a very bad cold.  Pt notified clinic yesterday of cold.  No fevers per patient.

## 2019-08-12 NOTE — IP AVS SNAPSHOT
[address] [address]                                    After Visit Summary   8/12/2019    Shalini Bansal    MRN: [id number]           After Visit Summary Signature Page    I have received my discharge instructions, and my questions have been answered. I have discussed any challenges I see with this plan with the nurse or doctor.    ..........................................................................................................................................  Patient/Patient Representative Signature      ..........................................................................................................................................  Patient Representative Print Name and Relationship to Patient    ..................................................               ................................................  Date                                   Time    ..........................................................................................................................................  Reviewed by Signature/Title    ...................................................              ..............................................  Date                                               Time          22EPIC Rev 08/18
